# Patient Record
Sex: FEMALE | Race: WHITE | NOT HISPANIC OR LATINO | Employment: PART TIME | URBAN - METROPOLITAN AREA
[De-identification: names, ages, dates, MRNs, and addresses within clinical notes are randomized per-mention and may not be internally consistent; named-entity substitution may affect disease eponyms.]

---

## 2021-11-29 ENCOUNTER — OFFICE VISIT (OUTPATIENT)
Dept: ORTHOPEDIC SURGERY | Facility: CLINIC | Age: 61
End: 2021-11-29

## 2021-11-29 VITALS
WEIGHT: 161.2 LBS | DIASTOLIC BLOOD PRESSURE: 82 MMHG | BODY MASS INDEX: 31.65 KG/M2 | SYSTOLIC BLOOD PRESSURE: 122 MMHG | HEIGHT: 60 IN

## 2021-11-29 DIAGNOSIS — M25.511 CHRONIC RIGHT SHOULDER PAIN: Primary | ICD-10-CM

## 2021-11-29 DIAGNOSIS — G89.29 CHRONIC RIGHT SHOULDER PAIN: Primary | ICD-10-CM

## 2021-11-29 PROCEDURE — 99203 OFFICE O/P NEW LOW 30 MIN: CPT | Performed by: ORTHOPAEDIC SURGERY

## 2021-11-29 PROCEDURE — 20610 DRAIN/INJ JOINT/BURSA W/O US: CPT | Performed by: ORTHOPAEDIC SURGERY

## 2021-11-29 RX ORDER — LIDOCAINE HYDROCHLORIDE 10 MG/ML
4 INJECTION, SOLUTION EPIDURAL; INFILTRATION; INTRACAUDAL; PERINEURAL
Status: COMPLETED | OUTPATIENT
Start: 2021-11-29 | End: 2021-11-29

## 2021-11-29 RX ORDER — DICLOFENAC SODIUM 75 MG/1
TABLET, DELAYED RELEASE ORAL
COMMUNITY
Start: 2021-11-04

## 2021-11-29 RX ORDER — BUPIVACAINE HYDROCHLORIDE 2.5 MG/ML
4 INJECTION, SOLUTION EPIDURAL; INFILTRATION; INTRACAUDAL
Status: COMPLETED | OUTPATIENT
Start: 2021-11-29 | End: 2021-11-29

## 2021-11-29 RX ORDER — TRIAMCINOLONE ACETONIDE 40 MG/ML
40 INJECTION, SUSPENSION INTRA-ARTICULAR; INTRAMUSCULAR
Status: COMPLETED | OUTPATIENT
Start: 2021-11-29 | End: 2021-11-29

## 2021-11-29 RX ADMIN — LIDOCAINE HYDROCHLORIDE 4 ML: 10 INJECTION, SOLUTION EPIDURAL; INFILTRATION; INTRACAUDAL; PERINEURAL at 13:47

## 2021-11-29 RX ADMIN — TRIAMCINOLONE ACETONIDE 40 MG: 40 INJECTION, SUSPENSION INTRA-ARTICULAR; INTRAMUSCULAR at 13:47

## 2021-11-29 RX ADMIN — BUPIVACAINE HYDROCHLORIDE 4 ML: 2.5 INJECTION, SOLUTION EPIDURAL; INFILTRATION; INTRACAUDAL at 13:47

## 2021-11-29 NOTE — PROGRESS NOTES
"      [facility] [place] [facility]opa[facility] Surgery Clinic Note    Subjective     CC: Pain and Initial Evaluation of the Right Shoulder      HPI  Teagan Perez is a 61 y.o. female who presents with new problem of: right shoulder pain.  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 The issue has been ongoing for 10 or more year(s). Pain is a 7/10 on the pain scale. Pain is described as aching. Associated symptoms include pain. The pain is worse with any movement of the joint; pain medication and/or NSAID improve the pain. Previous treatments have included: NSAIDS.    I have reviewed the following portions of the patient's history:History of Present Illness and review of systems.    She is taking diclofenac.  No other treatment    Review of Systems   Musculoskeletal: Positive for arthralgias.   All other systems reviewed and are negative.      ROS:    Constiutional:Pt denies fever, chills, nausea, or vomiting.  MSK:as above      Objective      Past Medical History  History reviewed. No pertinent past medical history.      Physical Exam  /82   Ht 152.4 cm (60\")   Wt 73.1 kg (161 lb 3.2 oz)   BMI 31.48 kg/m²     Body mass index is 31.48 kg/m².    Patient is well nourished and well developed.        Ortho Exam  Right shoulder positive impingement.  Tender biceps.  Near full motion full-strength    Imaging/Labs/EMG Reviewed:  Imaging Results (Last 24 Hours)     ** No results found for the last 24 hours. **      I personally interpreted x-rays from November 16 which show some glenohumeral arthritis with small osteophytes    Assessment:  1. Chronic right shoulder pain        Plan:  1. Recommend over the counter anti-inflammatories for pain and/or swelling  2. I injected the right shoulder subacromial space with cortisone.  She will follow-up when the injection wears off.  If it wears off sooner we will get an MRI to evaluate for possible rotator cuff tear.  Her symptoms are more consistent with rotator " cuff tendinopathy versus glenohumeral arthritis at this time    Follow Up:   Return if symptoms worsen or fail to improve.      Medical Decision Making  Management Options : Low - OTC Drugs        Lukasz Snider M.D., [facility]  Orthopedic Surgeon  Fellowship Trained Sports Medicine  [facility]  Orthopedics and Sports Medicine  [address] [place] [address], [address]  [place] [address]    EMR Dragon/Transcription disclaimer:  Much of this encounter note is an electronic transcription of spoken language to printed text. Electronic transcription of spoken language may permit erroneous, or at times, nonsensical words or phrases to be inadvertently transcribed. Although I have reviewed the note for such errors, some may still exist.

## 2021-11-29 NOTE — PROGRESS NOTES
Procedure   Large Joint Arthrocentesis: R subacromial bursa  Date/Time: 11/29/2021 1:47 PM  Consent given by: patient  Site marked: site marked  Timeout: Immediately prior to procedure a time out was called to verify the correct patient, procedure, equipment, support staff and site/side marked as required   Supporting Documentation  Indications: pain   Procedure Details  Location: shoulder - R subacromial bursa  Preparation: Patient was prepped and draped in the usual sterile fashion  Needle size: 22 G  Approach: posterior  Medications administered: 4 mL bupivacaine (PF) 0.25 %; 4 mL lidocaine PF 1% 1 %; 40 mg triamcinolone acetonide 40 MG/ML  Patient tolerance: patient tolerated the procedure well with no immediate complications